# Patient Record
Sex: MALE | Race: WHITE | NOT HISPANIC OR LATINO | URBAN - METROPOLITAN AREA
[De-identification: names, ages, dates, MRNs, and addresses within clinical notes are randomized per-mention and may not be internally consistent; named-entity substitution may affect disease eponyms.]

---

## 2022-05-26 ENCOUNTER — EMERGENCY (EMERGENCY)
Facility: HOSPITAL | Age: 27
LOS: 1 days | End: 2022-05-26
Attending: EMERGENCY MEDICINE
Payer: COMMERCIAL

## 2022-05-26 VITALS
OXYGEN SATURATION: 99 % | RESPIRATION RATE: 18 BRPM | DIASTOLIC BLOOD PRESSURE: 81 MMHG | HEART RATE: 80 BPM | TEMPERATURE: 98 F | SYSTOLIC BLOOD PRESSURE: 125 MMHG

## 2022-05-26 VITALS
SYSTOLIC BLOOD PRESSURE: 132 MMHG | DIASTOLIC BLOOD PRESSURE: 85 MMHG | WEIGHT: 154.98 LBS | TEMPERATURE: 99 F | OXYGEN SATURATION: 98 % | RESPIRATION RATE: 16 BRPM | HEART RATE: 76 BPM

## 2022-05-26 LAB
APPEARANCE UR: CLEAR — SIGNIFICANT CHANGE UP
BACTERIA # UR AUTO: ABNORMAL
BILIRUB UR-MCNC: NEGATIVE — SIGNIFICANT CHANGE UP
COLOR SPEC: YELLOW — SIGNIFICANT CHANGE UP
DIFF PNL FLD: NEGATIVE — SIGNIFICANT CHANGE UP
EPI CELLS # UR: NEGATIVE — SIGNIFICANT CHANGE UP
GLUCOSE UR QL: NEGATIVE — SIGNIFICANT CHANGE UP
HYALINE CASTS # UR AUTO: NEGATIVE — SIGNIFICANT CHANGE UP
KETONES UR-MCNC: NEGATIVE — SIGNIFICANT CHANGE UP
LEUKOCYTE ESTERASE UR-ACNC: NEGATIVE — SIGNIFICANT CHANGE UP
NITRITE UR-MCNC: NEGATIVE — SIGNIFICANT CHANGE UP
PH UR: 5 — SIGNIFICANT CHANGE UP (ref 5–8)
PROT UR-MCNC: NEGATIVE — SIGNIFICANT CHANGE UP
RBC CASTS # UR COMP ASSIST: SIGNIFICANT CHANGE UP /HPF (ref 0–4)
SP GR SPEC: 1.02 — SIGNIFICANT CHANGE UP (ref 1.01–1.02)
UROBILINOGEN FLD QL: NEGATIVE — SIGNIFICANT CHANGE UP
WBC UR QL: SIGNIFICANT CHANGE UP

## 2022-05-26 PROCEDURE — 99284 EMERGENCY DEPT VISIT MOD MDM: CPT

## 2022-05-26 PROCEDURE — 99284 EMERGENCY DEPT VISIT MOD MDM: CPT | Mod: 25

## 2022-05-26 PROCEDURE — 76870 US EXAM SCROTUM: CPT | Mod: 26

## 2022-05-26 PROCEDURE — 76870 US EXAM SCROTUM: CPT

## 2022-05-26 PROCEDURE — 87086 URINE CULTURE/COLONY COUNT: CPT

## 2022-05-26 PROCEDURE — 81001 URINALYSIS AUTO W/SCOPE: CPT

## 2022-05-26 RX ORDER — CEFUROXIME AXETIL 250 MG
1 TABLET ORAL
Qty: 14 | Refills: 0
Start: 2022-05-26 | End: 2022-06-01

## 2022-05-26 NOTE — ED PROVIDER NOTE - PATIENT PORTAL LINK FT
You can access the FollowMyHealth Patient Portal offered by [facility] by registering at the following website: [email or web address]. By joining [employer]’s FollowMyHealth portal, you will also be able to view your health information using other applications (apps) compatible with our system.

## 2022-05-26 NOTE — ED PROVIDER NOTE - NSFOLLOWUPINSTRUCTIONS_ED_ALL_ED_FT
1) Follow-up with your Primary Medical Doctor. Call next business day for prompt follow-up.  2) Return to Emergency room for any worsening or persistent pain, weakness, fever, or any other concerning symptoms.  3) See attached instruction sheets for additional information, including information regarding signs and symptoms to look out for, reasons to seek immediate care and other important instructions.  4) Follow-up with your Urologist - call in morning for prompt follow-up  5) Ceftin twice daily for 7 days

## 2022-05-26 NOTE — ED ADULT NURSE NOTE - OBJECTIVE STATEMENT
Pt. received alert and oriented x3 with chief complaint of left testicular pain starting 1 day ago and worsening today. Pt. denies any trauma to affected area.

## 2022-05-26 NOTE — ED PROVIDER NOTE - GENITOURINARY, MLM
No discharge, lesions. Nl penis. L testicle, nl lie, mild tend post aspect of testicle. Nl cremesteric bl, nl r testicle.

## 2022-05-26 NOTE — ED PROVIDER NOTE - PROGRESS NOTE DETAILS
Pt doing well , no acute co or changes. Dw pt - will fu with his urologist asap and will return with any changes. Dw pt re scrotal / test pain prec/ inst, risk of occult infxn.

## 2022-05-26 NOTE — ED PROVIDER NOTE - OBJECTIVE STATEMENT
26 yo M p/w L testicular pain since ~ 1130 pm last night. no fall / trauma. no recent sexual encounters. No fever/chills. no acute abd pain. no n/v/d. no dysuria/ hematuria. no recent illness. no agg/allev factors. NO other acute co or changes. Pt vaccinated for covid, no exposures.

## 2022-05-26 NOTE — ED PROVIDER NOTE - CARE PROVIDER_API CALL
JO ANN [facility]  Internal Medicine  [address], [place] [id number]  Phone: [phone]  Fax: [phone]  Follow Up Time:     Joni Gooden  UROLOGY  [address] [place] [address]  Phone: [phone]  Fax: [phone]  Follow Up Time:

## 2022-05-26 NOTE — ED PROVIDER NOTE - CARE PROVIDERS DIRECT ADDRESSES
,DirectAddress_Unknown,[email or web address]

## 2022-05-27 LAB
CULTURE RESULTS: NO GROWTH — SIGNIFICANT CHANGE UP
SPECIMEN SOURCE: SIGNIFICANT CHANGE UP

## 2022-10-11 ENCOUNTER — OFFICE (OUTPATIENT)
Dept: URBAN - METROPOLITAN AREA CLINIC 63 | Facility: CLINIC | Age: 27
Setting detail: OPHTHALMOLOGY
End: 2022-10-11
Payer: COMMERCIAL

## 2022-10-11 DIAGNOSIS — H40.013: ICD-10-CM

## 2022-10-11 DIAGNOSIS — H52.13: ICD-10-CM

## 2022-10-11 PROCEDURE — 92015 DETERMINE REFRACTIVE STATE: CPT | Performed by: STUDENT IN AN ORGANIZED HEALTH CARE EDUCATION/TRAINING PROGRAM

## 2022-10-11 PROCEDURE — 92133 CPTRZD OPH DX IMG PST SGM ON: CPT | Performed by: STUDENT IN AN ORGANIZED HEALTH CARE EDUCATION/TRAINING PROGRAM

## 2022-10-11 PROCEDURE — 92002 INTRM OPH EXAM NEW PATIENT: CPT | Performed by: STUDENT IN AN ORGANIZED HEALTH CARE EDUCATION/TRAINING PROGRAM

## 2022-10-11 ASSESSMENT — REFRACTION_MANIFEST
OS_VA1: 20/20
OD_CYLINDER: -0.50
OS_CYLINDER: -1.00
OS_SPHERE: -8.00
OS_AXIS: 005
OD_VA1: 20/20
OD_SPHERE: -6.50
OD_AXIS: 020

## 2022-10-11 ASSESSMENT — KERATOMETRY
OS_AXISANGLE_DEGREES: 078
OS_K2POWER_DIOPTERS: 45.00
OS_K1POWER_DIOPTERS: 43.75
OD_K1POWER_DIOPTERS: 43.75
OD_K2POWER_DIOPTERS: 44.75
OD_AXISANGLE_DEGREES: 095

## 2022-10-11 ASSESSMENT — SPHEQUIV_DERIVED
OS_SPHEQUIV: -8.75
OS_SPHEQUIV: -8.5
OD_SPHEQUIV: -6.75
OD_SPHEQUIV: -6.75

## 2022-10-11 ASSESSMENT — REFRACTION_AUTOREFRACTION
OD_SPHERE: -6.50
OD_CYLINDER: -0.50
OS_SPHERE: -8.25
OS_CYLINDER: -1.00
OD_AXIS: 019
OS_AXIS: 003

## 2022-10-11 ASSESSMENT — REFRACTION_CURRENTRX
OS_OVR_VA: 20/
OD_CYLINDER: -0.75
OS_VPRISM_DIRECTION: SV
OS_SPHERE: -7.50
OD_VPRISM_DIRECTION: SV
OS_CYLINDER: -1.25
OD_AXIS: 180
OD_SPHERE: -6.00
OD_OVR_VA: 20/
OS_AXIS: 158

## 2022-10-11 ASSESSMENT — VISUAL ACUITY
OD_BCVA: 20/20
OS_BCVA: 20/20

## 2022-10-11 ASSESSMENT — AXIALLENGTH_DERIVED
OD_AL: 26.2073
OS_AL: 27.1445
OD_AL: 26.2073
OS_AL: 27.0162

## 2022-10-11 ASSESSMENT — TONOMETRY
OD_IOP_MMHG: 18
OS_IOP_MMHG: 18

## 2022-10-11 ASSESSMENT — CONFRONTATIONAL VISUAL FIELD TEST (CVF)
OD_FINDINGS: FULL
OS_FINDINGS: FULL

## 2023-10-12 ENCOUNTER — OFFICE (OUTPATIENT)
Dept: URBAN - METROPOLITAN AREA CLINIC 63 | Facility: CLINIC | Age: 28
Setting detail: OPHTHALMOLOGY
End: 2023-10-12
Payer: COMMERCIAL

## 2023-10-12 DIAGNOSIS — H40.013: ICD-10-CM

## 2023-10-12 DIAGNOSIS — H52.13: ICD-10-CM

## 2023-10-12 DIAGNOSIS — H35.413: ICD-10-CM

## 2023-10-12 PROCEDURE — 92014 COMPRE OPH EXAM EST PT 1/>: CPT | Performed by: STUDENT IN AN ORGANIZED HEALTH CARE EDUCATION/TRAINING PROGRAM

## 2023-10-12 PROCEDURE — 92133 CPTRZD OPH DX IMG PST SGM ON: CPT | Performed by: STUDENT IN AN ORGANIZED HEALTH CARE EDUCATION/TRAINING PROGRAM

## 2023-10-12 PROCEDURE — 92015 DETERMINE REFRACTIVE STATE: CPT | Performed by: STUDENT IN AN ORGANIZED HEALTH CARE EDUCATION/TRAINING PROGRAM

## 2023-10-12 ASSESSMENT — REFRACTION_MANIFEST
OS_VA1: 20/20
OU_VA: 20/20
OS_SPHERE: -8.00
OS_VA1: 20/20
OD_CYLINDER: -1.00
OS_CYLINDER: -1.00
OD_VA1: 20/20
OD_AXIS: 005
OS_AXIS: 165
OD_VA1: 20/20
OD_SPHERE: -6.25
OD_CYLINDER: -0.50
OS_AXIS: 005
OD_SPHERE: -6.50
OS_CYLINDER: -1.00
OD_AXIS: 020
OS_SPHERE: -7.75

## 2023-10-12 ASSESSMENT — TONOMETRY
OD_IOP_MMHG: 15
OS_IOP_MMHG: 18

## 2023-10-12 ASSESSMENT — SPHEQUIV_DERIVED
OS_SPHEQUIV: -8.5
OD_SPHEQUIV: -6.75
OS_SPHEQUIV: -8.375
OS_SPHEQUIV: -8.25
OD_SPHEQUIV: -6.75
OD_SPHEQUIV: -6.75

## 2023-10-12 ASSESSMENT — REFRACTION_AUTOREFRACTION
OS_CYLINDER: -1.25
OD_CYLINDER: -1.00
OD_SPHERE: -6.25
OD_AXIS: 006
OS_AXIS: 165
OS_SPHERE: -7.75

## 2023-10-12 ASSESSMENT — AXIALLENGTH_DERIVED
OD_AL: 26.1507
OD_AL: 26.1507
OS_AL: 26.9488
OD_AL: 26.1507
OS_AL: 27.0125
OS_AL: 27.0765

## 2023-10-12 ASSESSMENT — KERATOMETRY
OS_K2POWER_DIOPTERS: 44.75
OD_K1POWER_DIOPTERS: 43.75
OD_K2POWER_DIOPTERS: 45.00
OD_AXISANGLE_DEGREES: 097
OS_AXISANGLE_DEGREES: 075
OS_K1POWER_DIOPTERS: 43.75

## 2023-10-12 ASSESSMENT — REFRACTION_CURRENTRX
OD_AXIS: 180
OS_VPRISM_DIRECTION: SV
OD_SPHERE: -6.00
OD_OVR_VA: 20/
OD_CYLINDER: -0.75
OS_AXIS: 158
OS_SPHERE: -7.50
OS_OVR_VA: 20/
OD_VPRISM_DIRECTION: SV
OS_CYLINDER: -1.25

## 2023-10-12 ASSESSMENT — VISUAL ACUITY
OD_BCVA: 20/20
OS_BCVA: 20/20

## 2025-04-16 NOTE — ED PROVIDER NOTE - ENMT, MLM
Pt notified   Airway patent, Nasal mucosa clear. Mouth with normal mucosa. Throat has no vesicles, no oropharyngeal exudates and uvula is midline. neck supple. no meningeal signs.